# Patient Record
Sex: FEMALE | Race: WHITE
[De-identification: names, ages, dates, MRNs, and addresses within clinical notes are randomized per-mention and may not be internally consistent; named-entity substitution may affect disease eponyms.]

---

## 2019-11-09 NOTE — NUR
Pt resting quietly in bed, very pleasant jil greeable. Alert and oriented.
IVF to L A/C. Call light in reach will give bedside shift report to nightshift
nurse who will resume care.

## 2019-11-09 NOTE — NUR
Pt arrived to floor at this time with ER staff.  and son at bedside,
Maria Luisa in room, updated him on home medications being unknown.  Son states he
will call me with medications list later, will continue to monitor.

## 2019-11-10 NOTE — NUR
Assessment charted, pt doing well, resting in bed. Up to bathroom with me, SBA
with walker, did have 2 loose stools this am.  Tearful and unable to answer
some orientation questions like name of building and year.  Feeling poorly
since she didn't sleep, stomach cramping from stools.  Son Pavan called for
update and they will be up here shortly.  IVF to L A/C. Will continue to
monitor.

## 2019-11-10 NOTE — NUR
Bedside shift report received from AVIS Dc. pT in bed resting, tearful,
states she didn't sleep last night, per night shift did get confused at times.
Will continue to monitor.

## 2019-11-10 NOTE — NUR
Discharge teaching completed at [facility] time. INT d/c'd, tip intact. Reviewed
discharge packet, scripts, f/u, answered all questions.  Pt left via w/c with
all belongings, family to drive home, criteria met.

## 2019-11-10 NOTE — NUR
Plan is to return home with  Raad and continue with home health
services. Patient reports that she is having a hard to concentrating and can
not give me information at this time. Patient having hard time remembering her
sons name. States that her  has dementia and her son is at home helping
out. SW allowing for an additional day for assessments. Will attempt again
tomorrow.

## 2019-11-10 NOTE — NUR
Bedside report received from Vanessa. Pt in bed eating dinner. Pt had just arrived
to the medical floor not long ago. Med req had been completed. Pt requests
Ambien, Medication was held by doctors during the requisition process. Pt had
a restless night. Ambulates with walker and 1-2 assist well. Pt is upset she
can not be home with her  to take care of him. Bedside report with AVIS Mendez to handoff care. AVIS Mendez was aware of the situation with the pt's son, and
that he is at home with his father to assist in taking care of him. This
seemed to relieve some anxiety, but not all from the pt. No further concerns
at this time. Call light within reach of the patient.

## 2019-11-12 NOTE — NUR
VLAD mendoza responded to a  consult for patient due to
patient's ability to be safe in assisted living. The patient lives in
independent living at [facility] with her . VLAD mendoza met with
the patient and the patient's daughter, Haydee and patient's son who was on the
phone. The patient has one courtesy day at [facility]. The patient and her children
would like to have home health services for medication management.  Haydee and
Pavan previously discussed having home health with Wes at [facility] and would
like them to provide the services. Due to the nature of the ED visit (an
unattended fall) PT/OT were ordered as well. VLAD mendoza recommended the
[facility]. Jayla from [facility] will set up a consult
for the patient and her . VLAD mendoza faxed order and referral.
VLAD mendoza collaborated the above information with the patient's nurse.

## 2023-02-13 ENCOUNTER — HOSPITAL ENCOUNTER (OUTPATIENT)
Dept: HOSPITAL 19 - COL.RAD | Age: 88
End: 2023-02-13
Attending: INTERNAL MEDICINE
Payer: MEDICARE

## 2023-02-13 DIAGNOSIS — K76.9: Primary | ICD-10-CM

## 2023-02-13 DIAGNOSIS — M47.819: ICD-10-CM
